# Patient Record
Sex: FEMALE
[De-identification: names, ages, dates, MRNs, and addresses within clinical notes are randomized per-mention and may not be internally consistent; named-entity substitution may affect disease eponyms.]

---

## 2018-11-11 ENCOUNTER — HOSPITAL ENCOUNTER (EMERGENCY)
Dept: HOSPITAL 31 - C.ER | Age: 73
Discharge: HOME | End: 2018-11-11
Payer: MEDICARE

## 2018-11-11 VITALS
HEART RATE: 68 BPM | OXYGEN SATURATION: 98 % | DIASTOLIC BLOOD PRESSURE: 79 MMHG | RESPIRATION RATE: 18 BRPM | TEMPERATURE: 98.1 F | SYSTOLIC BLOOD PRESSURE: 180 MMHG

## 2018-11-11 VITALS — BODY MASS INDEX: 22.2 KG/M2

## 2018-11-11 DIAGNOSIS — M25.521: Primary | ICD-10-CM

## 2018-11-11 NOTE — C.PDOC
History Of Present Illness


72 y/o female with PMHx of osteopenia and arthritis presents to the ED 

complaining of atraumatic joint pain worsening for the past 3 days. She reports 

initially she had pain in the left arm/shoulder and began attending physical 

therapy. She notes for the last 3 weeks, she developed pain to the right 

shoulder and upper back, now described as sharp and shooting. Also complaining 

of joint pain in the right elbow and right fingers, associated with some 

swelling. No numbness, tingling, or focal weakness. She reports taking Tylenol w

ithout relief. 





Time Seen by Provider: 11/11/18 14:24


Chief Complaint (Nursing): Upper Extremity Problem/Injury


History Per: Patient


History/Exam Limitations: no limitations


Onset/Duration Of Symptoms: Days


Current Symptoms Are (Timing): Worse


Quality: Sharp





Past Medical History


Reviewed: Historical Data, Nursing Documentation, Vital Signs


Vital Signs: 





                                Last Vital Signs











Temp  98.1 F   11/11/18 14:19


 


Pulse  68   11/11/18 14:19


 


Resp  18   11/11/18 14:19


 


BP  180/79 H  11/11/18 14:19


 


Pulse Ox  98   11/11/18 14:19














- Medical History


PMH: Arthritis, Asthma


Surgical History: 


   Denies: Pacemaker


Other Surgeries: Hysterectomy, Thyroidectomy





- [facility] Procedures











COLONOSCOPY (12/07/98)


ENDOSC POLYPECTOMY OF LG INTEST (11/05/03)


EXCIS LESION OF MUSCLE (01/27/14)


UNILAT THYROID LOBECTOMY (06/27/01)








Family History: States: No Known Family Hx





- Social History


Hx Tobacco Use: No


Hx Alcohol Use: No


Hx Substance Use: No





- Immunization History


Hx Tetanus Toxoid Vaccination: No


Hx Influenza Vaccination: Yes


Hx Pneumococcal Vaccination: No





Review Of Systems


Constitutional: Negative for: Fever


Cardiovascular: Negative for: Chest Pain


Respiratory: Negative for: Shortness of Breath


Musculoskeletal: Positive for: Shoulder Pain, Arm Pain, Back Pain, Hand Pain


Neurological: Negative for: Weakness, Numbness, Incoordination





Physical Exam





- Physical Exam


Appears: Non-toxic, No Acute Distress


Skin: Warm, Dry, No Rash


Head: Atraumatic, Normacephalic


Eye(s): bilateral: Normal Inspection, PERRL, EOMI


Oral Mucosa: Moist


Neck: Decreased ROM, No Midline Cervical Tenderness, Paracervical Tenderness 

(bilateral paracervical tenderness and muscle spasm), Supple


Chest: Symmetrical


Respiratory: No Accessory Muscle Use, Other (speaking in complete sentences)


Extremity: Right: Other (Swelling to the DIP joints on right hand), Bilateral: 

Atraumatic, Normal Color And Temperature, Normal ROM


Pulses: Left Radial: Normal, Right Radial: Normal


Neurological/Psych: Oriented x3, Normal Speech





ED Course And Treatment


O2 Sat by Pulse Oximetry: 98 (RA)


Pulse Ox Interpretation: Normal





Medical Decision Making


Medical Decision Making: 





Plan:


--Lidoderm patch


--Motrin PO


--Flexeril PO





On re-exam, the patient reports improvement of symptoms. Lungs are CTA, heart is

RRR, abdomen is soft, non-tender and tolerating PO well. Steady gait. Follow up 

with the medical doctor within 1-2 days without fail. Return if worsened. 





Disposition


Counseled Patient/Family Regarding: Diagnosis, Need For Followup, Rx Given





- Disposition


Referrals: 


Dimitri Henderson MD [Non-Staff] - 


Disposition: HOME/ ROUTINE


Disposition Time: 16:27


Condition: STABLE


Additional Instructions: 


Follow up with the medical doctor within 1-2 days. Return if worsened. 


Prescriptions: 


Cyclobenzaprine [Flexeril] 5 mg PO TID #21 tab


Lidocaine 5% [Lidoderm] 1 each TP DAILY #10 patch


Naproxen 375 mg PO BID #20 tablet


Instructions:  Osteoarthritis (DC)


Forms:  [facility] (English)





- POA


Present On Arrival: None





- Clinical Impression


Clinical Impression: 


 Joint pain








- PA / NP / Resident Statement


MD/DO has reviewed & agrees with the documentation as recorded.





- Scribe Statement


The provider has reviewed the documentation as recorded by the Scribe (Meryl Lovett)





All medical record entries made by the Scribe were at my direction and 

personally dictated by me. I have reviewed the chart and agree that the record 

accurately reflects my personal performance of the history, physical exam, 

medical decision making, and the department course for this patient. I have also

 personally directed, reviewed, and agree with the discharge instructions and 

disposition.

## 2021-09-09 ENCOUNTER — EMERGENCY VISIT (OUTPATIENT)
Dept: URBAN - METROPOLITAN AREA CLINIC 110 | Facility: CLINIC | Age: 76
End: 2021-09-09

## 2021-09-09 DIAGNOSIS — H10.13: ICD-10-CM

## 2021-09-09 DIAGNOSIS — H52.03: ICD-10-CM

## 2021-09-09 PROCEDURE — 92012 INTRM OPH EXAM EST PATIENT: CPT

## 2021-09-09 PROCEDURE — 92015 DETERMINE REFRACTIVE STATE: CPT

## 2021-09-09 ASSESSMENT — VISUAL ACUITY
OS_CC: 20/30-3
OD_CC: 20/30-3

## 2021-09-09 ASSESSMENT — TONOMETRY
OS_IOP_MMHG: 10
OD_IOP_MMHG: 15

## 2021-09-23 ENCOUNTER — FOLLOW UP (OUTPATIENT)
Dept: URBAN - METROPOLITAN AREA CLINIC 110 | Facility: CLINIC | Age: 76
End: 2021-09-23

## 2021-09-23 DIAGNOSIS — H04.123: ICD-10-CM

## 2021-09-23 DIAGNOSIS — H25.813: ICD-10-CM

## 2021-09-23 DIAGNOSIS — H01.026: ICD-10-CM

## 2021-09-23 DIAGNOSIS — H01.023: ICD-10-CM

## 2021-09-23 DIAGNOSIS — H10.13: ICD-10-CM

## 2021-09-23 PROCEDURE — 92012 INTRM OPH EXAM EST PATIENT: CPT

## 2021-09-23 ASSESSMENT — VISUAL ACUITY
OU_CC: J1
OD_CC: 20/40+2
OD_PH: 20/30-3
OS_CC: 20/30-2
OS_PH: 20/30

## 2021-09-23 ASSESSMENT — TONOMETRY
OD_IOP_MMHG: 13
OS_IOP_MMHG: 15

## 2023-01-26 ENCOUNTER — ESTABLISHED COMPREHENSIVE EXAM (OUTPATIENT)
Dept: URBAN - METROPOLITAN AREA CLINIC 110 | Facility: CLINIC | Age: 78
End: 2023-01-26

## 2023-01-26 DIAGNOSIS — H25.813: ICD-10-CM

## 2023-01-26 DIAGNOSIS — H52.03: ICD-10-CM

## 2023-01-26 DIAGNOSIS — H02.403: ICD-10-CM

## 2023-01-26 DIAGNOSIS — E11.9: ICD-10-CM

## 2023-01-26 DIAGNOSIS — H52.4: ICD-10-CM

## 2023-01-26 DIAGNOSIS — H04.123: ICD-10-CM

## 2023-01-26 DIAGNOSIS — H43.813: ICD-10-CM

## 2023-01-26 PROCEDURE — 92202 OPSCPY EXTND ON/MAC DRAW: CPT

## 2023-01-26 PROCEDURE — 92014 COMPRE OPH EXAM EST PT 1/>: CPT

## 2023-01-26 PROCEDURE — 92015 DETERMINE REFRACTIVE STATE: CPT

## 2023-01-26 PROCEDURE — 92020 GONIOSCOPY: CPT

## 2023-01-26 ASSESSMENT — TONOMETRY
OS_IOP_MMHG: 12
OD_IOP_MMHG: 11

## 2023-01-26 ASSESSMENT — VISUAL ACUITY
OU_CC: J1
OS_CC: 20/30
OD_CC: 20/40

## 2023-04-27 ENCOUNTER — ESTABLISHED (OUTPATIENT)
Dept: URBAN - METROPOLITAN AREA CLINIC 110 | Facility: CLINIC | Age: 78
End: 2023-04-27

## 2023-04-27 DIAGNOSIS — H02.403: ICD-10-CM

## 2023-04-27 DIAGNOSIS — H04.123: ICD-10-CM

## 2023-04-27 DIAGNOSIS — H25.813: ICD-10-CM

## 2023-04-27 PROCEDURE — 92136 OPHTHALMIC BIOMETRY: CPT

## 2023-04-27 PROCEDURE — 99214 OFFICE O/P EST MOD 30 MIN: CPT

## 2023-04-27 ASSESSMENT — TONOMETRY
OD_IOP_MMHG: 11
OS_IOP_MMHG: 11

## 2023-04-27 ASSESSMENT — VISUAL ACUITY
OU_CC: J3-1
OS_CC: 20/30-1
OD_CC: 20/40

## 2023-06-28 ENCOUNTER — SURGERY/PROCEDURE (OUTPATIENT)
Dept: URBAN - METROPOLITAN AREA SURGICAL CENTER 32 | Facility: SURGICAL CENTER | Age: 78
End: 2023-06-28

## 2023-06-28 DIAGNOSIS — H25.813: ICD-10-CM

## 2023-06-28 PROCEDURE — 66984 XCAPSL CTRC RMVL W/O ECP: CPT

## 2023-06-29 ENCOUNTER — 1 DAY POST-OP (OUTPATIENT)
Dept: URBAN - METROPOLITAN AREA CLINIC 110 | Facility: CLINIC | Age: 78
End: 2023-06-29

## 2023-06-29 DIAGNOSIS — Z96.1: ICD-10-CM

## 2023-06-29 PROCEDURE — 99024 POSTOP FOLLOW-UP VISIT: CPT

## 2023-06-29 ASSESSMENT — VISUAL ACUITY: OD_SC: 20/50

## 2023-06-29 ASSESSMENT — TONOMETRY: OD_IOP_MMHG: 9

## 2023-07-06 ENCOUNTER — 1 WEEK POST-OP (OUTPATIENT)
Dept: URBAN - METROPOLITAN AREA CLINIC 110 | Facility: CLINIC | Age: 78
End: 2023-07-06

## 2023-07-06 DIAGNOSIS — Z96.1: ICD-10-CM

## 2023-07-06 DIAGNOSIS — H25.812: ICD-10-CM

## 2023-07-06 PROCEDURE — 99214 OFFICE O/P EST MOD 30 MIN: CPT

## 2023-07-06 PROCEDURE — 76519 ECHO EXAM OF EYE: CPT | Mod: 26,LT

## 2023-07-06 ASSESSMENT — TONOMETRY
OS_IOP_MMHG: 12
OD_IOP_MMHG: 10

## 2023-07-06 ASSESSMENT — VISUAL ACUITY
OS_PH: 20/50-3
OS_SC: 20/200
OD_SC: 20/30

## 2023-07-12 ENCOUNTER — SURGERY/PROCEDURE (OUTPATIENT)
Dept: URBAN - METROPOLITAN AREA SURGICAL CENTER 32 | Facility: SURGICAL CENTER | Age: 78
End: 2023-07-12

## 2023-07-12 DIAGNOSIS — H25.812: ICD-10-CM

## 2023-07-12 PROBLEM — Z96.1 PSEUDOPHAKIA 1 DAY POST-OP: Noted: 2023-07-12

## 2023-07-12 PROCEDURE — 66984 XCAPSL CTRC RMVL W/O ECP: CPT | Mod: 79,LT

## 2023-07-13 ENCOUNTER — 1 DAY POST-OP (OUTPATIENT)
Dept: URBAN - METROPOLITAN AREA CLINIC 110 | Facility: CLINIC | Age: 78
End: 2023-07-13

## 2023-07-13 DIAGNOSIS — Z96.1: ICD-10-CM

## 2023-07-13 PROCEDURE — 99024 POSTOP FOLLOW-UP VISIT: CPT

## 2023-07-13 ASSESSMENT — VISUAL ACUITY
OS_SC: 20/25
OD_SC: 20/25+2

## 2023-07-13 ASSESSMENT — TONOMETRY
OS_IOP_MMHG: 9
OD_IOP_MMHG: 9

## 2023-07-17 ENCOUNTER — CONSULT EP (OUTPATIENT)
Dept: URBAN - METROPOLITAN AREA CLINIC 110 | Facility: CLINIC | Age: 78
End: 2023-07-17

## 2023-07-17 DIAGNOSIS — H02.403: ICD-10-CM

## 2023-07-17 DIAGNOSIS — H04.123: ICD-10-CM

## 2023-07-17 DIAGNOSIS — H02.835: ICD-10-CM

## 2023-07-17 DIAGNOSIS — H02.831: ICD-10-CM

## 2023-07-17 DIAGNOSIS — H02.834: ICD-10-CM

## 2023-07-17 DIAGNOSIS — H02.832: ICD-10-CM

## 2023-07-17 PROCEDURE — 92285 EXTERNAL OCULAR PHOTOGRAPHY: CPT

## 2023-07-17 PROCEDURE — 99214 OFFICE O/P EST MOD 30 MIN: CPT

## 2023-07-17 ASSESSMENT — VISUAL ACUITY
OS_SC: 20/25-2
OD_SC: 20/30-1

## 2023-07-18 ENCOUNTER — 1 WEEK POST-OP (OUTPATIENT)
Dept: URBAN - METROPOLITAN AREA CLINIC 110 | Facility: CLINIC | Age: 78
End: 2023-07-18

## 2023-07-18 DIAGNOSIS — Z96.1: ICD-10-CM

## 2023-07-18 PROCEDURE — 99024 POSTOP FOLLOW-UP VISIT: CPT

## 2023-07-18 ASSESSMENT — TONOMETRY
OS_IOP_MMHG: 8
OD_IOP_MMHG: 9

## 2023-07-18 ASSESSMENT — VISUAL ACUITY: OS_SC: 20/20-3

## 2023-08-08 ENCOUNTER — 1 MONTH POST-OP (OUTPATIENT)
Dept: URBAN - METROPOLITAN AREA CLINIC 110 | Facility: CLINIC | Age: 78
End: 2023-08-08

## 2023-08-08 DIAGNOSIS — H04.123: ICD-10-CM

## 2023-08-08 DIAGNOSIS — H02.831: ICD-10-CM

## 2023-08-08 DIAGNOSIS — H02.403: ICD-10-CM

## 2023-08-08 DIAGNOSIS — H02.834: ICD-10-CM

## 2023-08-08 DIAGNOSIS — Z96.1: ICD-10-CM

## 2023-08-08 PROCEDURE — 99024 POSTOP FOLLOW-UP VISIT: CPT

## 2023-08-08 ASSESSMENT — TONOMETRY
OD_IOP_MMHG: 9
OS_IOP_MMHG: 9

## 2023-08-08 ASSESSMENT — VISUAL ACUITY
OU_SC: J7
OD_SC: 20/30
OS_SC: 20/40-1

## 2023-08-24 ENCOUNTER — ESTABLISHED (OUTPATIENT)
Dept: URBAN - METROPOLITAN AREA CLINIC 110 | Facility: CLINIC | Age: 78
End: 2023-08-24

## 2023-08-24 DIAGNOSIS — H43.393: ICD-10-CM

## 2023-08-24 DIAGNOSIS — H04.123: ICD-10-CM

## 2023-08-24 PROCEDURE — 92014 COMPRE OPH EXAM EST PT 1/>: CPT

## 2023-08-24 ASSESSMENT — VISUAL ACUITY
OD_SC: 20/40-2
OS_SC: 20/40
OU_CC: J2

## 2023-08-24 ASSESSMENT — TONOMETRY
OS_IOP_MMHG: 7
OD_IOP_MMHG: 7

## 2023-08-28 ENCOUNTER — ESTABLISHED (OUTPATIENT)
Dept: URBAN - METROPOLITAN AREA CLINIC 110 | Facility: CLINIC | Age: 78
End: 2023-08-28

## 2023-08-28 DIAGNOSIS — H04.123: ICD-10-CM

## 2023-08-28 DIAGNOSIS — H02.834: ICD-10-CM

## 2023-08-28 DIAGNOSIS — H02.832: ICD-10-CM

## 2023-08-28 DIAGNOSIS — H02.403: ICD-10-CM

## 2023-08-28 DIAGNOSIS — H02.831: ICD-10-CM

## 2023-08-28 DIAGNOSIS — H02.835: ICD-10-CM

## 2023-08-28 PROCEDURE — 92012 INTRM OPH EXAM EST PATIENT: CPT

## 2023-08-28 ASSESSMENT — VISUAL ACUITY
OD_SC: 20/40
OS_SC: 20/40-2
OU_CC: J1+

## 2023-12-22 ENCOUNTER — TESTING ONLY (OUTPATIENT)
Dept: URBAN - METROPOLITAN AREA CLINIC 110 | Facility: CLINIC | Age: 78
End: 2023-12-22

## 2023-12-22 DIAGNOSIS — H02.403: ICD-10-CM

## 2023-12-22 PROCEDURE — 92083 EXTENDED VISUAL FIELD XM: CPT

## 2024-01-08 ENCOUNTER — FOLLOW UP (OUTPATIENT)
Dept: URBAN - METROPOLITAN AREA CLINIC 110 | Facility: CLINIC | Age: 79
End: 2024-01-08

## 2024-01-08 VITALS — HEART RATE: 61 BPM | DIASTOLIC BLOOD PRESSURE: 71 MMHG | SYSTOLIC BLOOD PRESSURE: 135 MMHG

## 2024-01-08 DIAGNOSIS — H02.831: ICD-10-CM

## 2024-01-08 DIAGNOSIS — H02.832: ICD-10-CM

## 2024-01-08 DIAGNOSIS — H02.834: ICD-10-CM

## 2024-01-08 DIAGNOSIS — H02.835: ICD-10-CM

## 2024-01-08 DIAGNOSIS — H02.403: ICD-10-CM

## 2024-01-08 PROCEDURE — 99213 OFFICE O/P EST LOW 20 MIN: CPT

## 2024-01-08 ASSESSMENT — VISUAL ACUITY
OS_SC: 20/30+3
OD_SC: 20/40+3

## 2024-01-15 ENCOUNTER — SURGERY/PROCEDURE (OUTPATIENT)
Dept: URBAN - METROPOLITAN AREA SURGICAL CENTER 32 | Facility: SURGICAL CENTER | Age: 79
End: 2024-01-15

## 2024-01-15 DIAGNOSIS — H02.831: ICD-10-CM

## 2024-01-15 DIAGNOSIS — H02.423: ICD-10-CM

## 2024-01-15 DIAGNOSIS — H02.832: ICD-10-CM

## 2024-01-15 DIAGNOSIS — H02.834: ICD-10-CM

## 2024-01-15 DIAGNOSIS — H02.835: ICD-10-CM

## 2024-01-15 PROCEDURE — 15823 BLEPHARP UPR EYELID XCSV SKN: CPT | Mod: 50

## 2024-01-15 PROCEDURE — 67904 REPAIR EYELID DEFECT: CPT | Mod: 50

## 2024-01-15 PROCEDURE — 15820 BLEPHAROPLASTY LOWER EYELID: CPT | Mod: 50

## 2024-01-22 ENCOUNTER — 1 WEEK POST-OP (OUTPATIENT)
Dept: URBAN - METROPOLITAN AREA CLINIC 110 | Facility: CLINIC | Age: 79
End: 2024-01-22

## 2024-01-22 DIAGNOSIS — H02.834: ICD-10-CM

## 2024-01-22 DIAGNOSIS — H02.403: ICD-10-CM

## 2024-01-22 DIAGNOSIS — H02.831: ICD-10-CM

## 2024-01-22 DIAGNOSIS — H04.123: ICD-10-CM

## 2024-01-22 DIAGNOSIS — H02.835: ICD-10-CM

## 2024-01-22 DIAGNOSIS — H02.832: ICD-10-CM

## 2024-01-22 PROCEDURE — 99024 POSTOP FOLLOW-UP VISIT: CPT

## 2024-01-22 PROCEDURE — 92285 EXTERNAL OCULAR PHOTOGRAPHY: CPT | Mod: NC

## 2024-01-22 ASSESSMENT — VISUAL ACUITY
OS_SC: 20/25-3
OD_SC: 20/40+3

## 2024-02-12 ENCOUNTER — 1 MONTH POST-OP (OUTPATIENT)
Dept: URBAN - METROPOLITAN AREA CLINIC 110 | Facility: CLINIC | Age: 79
End: 2024-02-12

## 2024-02-12 DIAGNOSIS — H02.403: ICD-10-CM

## 2024-02-12 PROCEDURE — 99024 POSTOP FOLLOW-UP VISIT: CPT

## 2024-02-12 ASSESSMENT — VISUAL ACUITY
OD_SC: 20/40
OS_SC: 20/25

## 2024-02-23 ENCOUNTER — ESTABLISHED COMPREHENSIVE EXAM (OUTPATIENT)
Dept: URBAN - METROPOLITAN AREA CLINIC 110 | Facility: CLINIC | Age: 79
End: 2024-02-23

## 2024-02-23 DIAGNOSIS — H02.403: ICD-10-CM

## 2024-02-23 DIAGNOSIS — H02.832: ICD-10-CM

## 2024-02-23 DIAGNOSIS — H02.834: ICD-10-CM

## 2024-02-23 DIAGNOSIS — E11.9: ICD-10-CM

## 2024-02-23 DIAGNOSIS — Z96.1: ICD-10-CM

## 2024-02-23 DIAGNOSIS — H43.393: ICD-10-CM

## 2024-02-23 DIAGNOSIS — H04.123: ICD-10-CM

## 2024-02-23 DIAGNOSIS — H02.831: ICD-10-CM

## 2024-02-23 DIAGNOSIS — H02.835: ICD-10-CM

## 2024-02-23 PROCEDURE — 92250 FUNDUS PHOTOGRAPHY W/I&R: CPT

## 2024-02-23 PROCEDURE — 92014 COMPRE OPH EXAM EST PT 1/>: CPT | Mod: 24

## 2024-02-23 ASSESSMENT — TONOMETRY
OS_IOP_MMHG: 10
OD_IOP_MMHG: 10

## 2024-02-23 ASSESSMENT — VISUAL ACUITY
OD_SC: 20/30-1
OU_CC: J1+
OS_SC: 20/25

## 2024-02-26 ENCOUNTER — POST-OP CHECK (OUTPATIENT)
Dept: URBAN - METROPOLITAN AREA CLINIC 110 | Facility: CLINIC | Age: 79
End: 2024-02-26

## 2024-02-26 DIAGNOSIS — H02.835: ICD-10-CM

## 2024-02-26 DIAGNOSIS — H02.403: ICD-10-CM

## 2024-02-26 DIAGNOSIS — H02.831: ICD-10-CM

## 2024-02-26 DIAGNOSIS — H02.834: ICD-10-CM

## 2024-02-26 DIAGNOSIS — L98.8: ICD-10-CM

## 2024-02-26 DIAGNOSIS — H02.832: ICD-10-CM

## 2024-02-26 PROCEDURE — 92285 EXTERNAL OCULAR PHOTOGRAPHY: CPT | Mod: NC

## 2024-02-26 PROCEDURE — 99024 POSTOP FOLLOW-UP VISIT: CPT

## 2024-02-26 ASSESSMENT — VISUAL ACUITY
OD_SC: 20/25-1
OS_SC: 20/20

## 2024-03-11 ENCOUNTER — CONSULTATION (OUTPATIENT)
Dept: URBAN - METROPOLITAN AREA CLINIC 110 | Facility: CLINIC | Age: 79
End: 2024-03-11

## 2024-03-11 DIAGNOSIS — L98.8: ICD-10-CM

## 2024-03-11 DIAGNOSIS — H02.403: ICD-10-CM

## 2024-03-11 DIAGNOSIS — H02.832: ICD-10-CM

## 2024-03-11 DIAGNOSIS — H02.834: ICD-10-CM

## 2024-03-11 DIAGNOSIS — H02.831: ICD-10-CM

## 2024-03-11 DIAGNOSIS — H02.835: ICD-10-CM

## 2024-03-11 PROCEDURE — 99024 POSTOP FOLLOW-UP VISIT: CPT

## 2024-03-11 PROCEDURE — 92285 EXTERNAL OCULAR PHOTOGRAPHY: CPT | Mod: NC

## 2024-03-11 ASSESSMENT — VISUAL ACUITY
OD_SC: 20/25
OS_SC: 20/20-2

## (undated) RX ORDER — NEOMYCIN SULFATE, POLYMYXIN B SULFATE AND DEXAMETHASONE 3.5; 10000; 1 MG/G; [USP'U]/G; MG/G: 1/4 OINTMENT OPHTHALMIC